# Patient Record
Sex: FEMALE | Race: WHITE
[De-identification: names, ages, dates, MRNs, and addresses within clinical notes are randomized per-mention and may not be internally consistent; named-entity substitution may affect disease eponyms.]

---

## 2019-03-29 ENCOUNTER — HOSPITAL ENCOUNTER (OUTPATIENT)
Dept: HOSPITAL 74 - JASU-ENDO | Age: 44
Discharge: HOME | End: 2019-03-29
Attending: INTERNAL MEDICINE
Payer: COMMERCIAL

## 2019-03-29 VITALS — BODY MASS INDEX: 25 KG/M2

## 2019-03-29 VITALS — TEMPERATURE: 98.3 F

## 2019-03-29 VITALS — DIASTOLIC BLOOD PRESSURE: 63 MMHG | SYSTOLIC BLOOD PRESSURE: 108 MMHG | HEART RATE: 69 BPM

## 2019-03-29 DIAGNOSIS — K64.8: Primary | ICD-10-CM

## 2019-03-29 DIAGNOSIS — K63.89: ICD-10-CM

## 2019-03-29 DIAGNOSIS — K31.7: ICD-10-CM

## 2019-03-29 DIAGNOSIS — K21.9: ICD-10-CM

## 2019-03-29 PROCEDURE — 0DB68ZX EXCISION OF STOMACH, VIA NATURAL OR ARTIFICIAL OPENING ENDOSCOPIC, DIAGNOSTIC: ICD-10-PCS | Performed by: INTERNAL MEDICINE

## 2019-03-29 PROCEDURE — 0DBE8ZX EXCISION OF LARGE INTESTINE, VIA NATURAL OR ARTIFICIAL OPENING ENDOSCOPIC, DIAGNOSTIC: ICD-10-PCS | Performed by: INTERNAL MEDICINE

## 2019-03-29 PROCEDURE — 0DB38ZX EXCISION OF LOWER ESOPHAGUS, VIA NATURAL OR ARTIFICIAL OPENING ENDOSCOPIC, DIAGNOSTIC: ICD-10-PCS | Performed by: INTERNAL MEDICINE

## 2019-04-01 NOTE — PATH
Surgical Pathology Report



Patient Name:  TAQUERIA HENDERSON

Accession #:  [id number]

[facility]. Rec. #:  [id number]                                                        

   /Age/Gender:  1975 (Age: 43) / F

Account:  [id number]                                                          

             Location: ASU-ENDOSCOPY

Taken:  3/29/2019

Received:  3/29/2019

Reported:  2019

Physicians:  Brigid Woodward M.D.

  



Specimen(s) Received

A: 2ND PORTION DUODENUM AND BULB 

B: ANTRUM 

C: GASTRIC FUNDUS POLYP AND GASTRIC BODY POLYP 

D: GE JUNCTION 

E: ILEUM BX 

F: CECUM 





Clinical History

Screening, constipation, change in bowel habit

Postoperative diagnosis: Gastric polyps, GERD, normal colon adhesions







Final Diagnosis

A. SECOND PORTION OF DUODENUM AND BULB, BIOPSY:

DUODENUM MUCOSAL WITH NO SIGNIFICANT PATHOLOGIC CHANGE.

NO HISTOLOGIC EVIDENCE OF CELIAC DISEASE.



B. ANTRUM, BIOPSY:

GASTRIC MUCOSAL WITH MILD CHRONIC GASTRITIS.

IMMUNOSTAIN FOR H. PYLORI IS NEGATIVE.

NEGATIVE FOR INTESTINAL METAPLASIA.



C. GASTRIC FUNDUS POLYP AND GASTRIC FUNDIC POLYP, BIOPSY:

FUNDIC GLAND POLYP, FRAGMENTS.

IMMUNOSTAIN FOR H. PYLORI IS NEGATIVE. 



D. GE JUNCTION, BIOPSY:

ESOPHAGEAL MUCOSA WITH CHANGES CONSISTENT WITH REFLUX ESOPHAGITIS.

NEGATIVE FOR INTESTINAL METAPLASIA.



E. ILEUM, BIOPSY:

ILEUM MUCOSA WITH REACTIVE LYMPHOID FOLLICLE IN THE LAMINA PROPRIA.

NO HISTOLOGIC EVIDENCE OF INTRAEPITHELIAL LYMPHOCYTOSIS.



F. CECUM, BIOPSY:

COLONIC MUCOSA WITH NO SIGNIFICANT PATHOLOGIC CHANGE.







***Electronically Signed***

Pako Calderon M.D.





Gross Description

A.  Received in formalin, labeled "biopsy second portion of duodenum and bulb"

are 5 tan, irregular portions of soft tissue ranging from 0.1-0.5 cm. in

greatest dimension. The specimens are submitted in toto in one cassette.



B.  Received in formalin, labeled "biopsy antrum averaging" are 2 tan, irregular

portions of soft tissue averaging 0.5 cm. in greatest dimension. The specimens

are submitted in toto in one cassette.



C.  Received in formalin, labeled "biopsy gastric fundus and body polyps" are 7

tan, irregular portions of soft tissue ranging from 0.1-0.4 cm. in greatest

dimension. The specimens are submitted in toto in one cassette.



D.  Received in formalin, labeled "biopsy GE junction" are 2 tan, irregular

portions of soft tissue measuring 0.1 and 0.3 cm. in greatest dimension. The

specimens are submitted in toto in one cassette.



E.  Received in formalin, labeled "biopsy ileum" are 2 tan, irregular portions

of soft tissue measuring 0.1 and 0.3 cm. in greatest dimension. The specimens

are submitted in toto in one cassette.



F.  Received in formalin, labeled "biopsy cecum" are 2 tan, irregular portions

of soft tissue measuring 0.3 and 0.4 cm. in greatest dimension. The specimens

are submitted in toto in one cassette.

/3/29/2019



[facility]/3/29/2019

## 2025-07-14 ENCOUNTER — OFFICE (OUTPATIENT)
Facility: LOCATION | Age: 50
Setting detail: OPHTHALMOLOGY
End: 2025-07-14
Payer: COMMERCIAL

## 2025-07-14 ENCOUNTER — RX ONLY (RX ONLY)
Age: 50
End: 2025-07-14

## 2025-07-14 DIAGNOSIS — H47.393: ICD-10-CM

## 2025-07-14 DIAGNOSIS — H16.223: ICD-10-CM

## 2025-07-14 PROCEDURE — 92250 FUNDUS PHOTOGRAPHY W/I&R: CPT | Performed by: OPHTHALMOLOGY

## 2025-07-14 PROCEDURE — 99204 OFFICE O/P NEW MOD 45 MIN: CPT | Performed by: OPHTHALMOLOGY

## 2025-07-14 ASSESSMENT — REFRACTION_CURRENTRX
OD_SPHERE: +1.50
OD_OVR_VA: 20/
OS_OVR_VA: 20/
OS_SPHERE: +1.50

## 2025-07-14 ASSESSMENT — PACHYMETRY
OS_CT_CORRECTION: -2
OD_CT_CORRECTION: -2
OD_CT_UM: 576
OS_CT_UM: 577

## 2025-07-14 ASSESSMENT — TONOMETRY
OD_IOP_MMHG: 14
OS_IOP_MMHG: 14

## 2025-07-14 ASSESSMENT — REFRACTION_AUTOREFRACTION
OD_SPHERE: -0.25
OS_CYLINDER: +0.25
OS_SPHERE: 0.00
OD_CYLINDER: 0.00
OS_AXIS: 152
OD_AXIS: 180

## 2025-07-14 ASSESSMENT — VISUAL ACUITY
OS_BCVA: 20/20
OD_BCVA: 20/20

## 2025-07-14 ASSESSMENT — SUPERFICIAL PUNCTATE KERATITIS (SPK)
OS_SPK: T
OD_SPK: T

## 2025-07-14 ASSESSMENT — KERATOMETRY
OS_K1POWER_DIOPTERS: 42.25
OS_K2POWER_DIOPTERS: 42.50
OD_AXISANGLE_DEGREES: 077
OS_AXISANGLE_DEGREES: 106
OD_K1POWER_DIOPTERS: 42.25
OD_K2POWER_DIOPTERS: 42.75

## 2025-07-14 ASSESSMENT — CONFRONTATIONAL VISUAL FIELD TEST (CVF)
OS_FINDINGS: FULL
OD_FINDINGS: FULL